# Patient Record
Sex: MALE | Race: WHITE
[De-identification: names, ages, dates, MRNs, and addresses within clinical notes are randomized per-mention and may not be internally consistent; named-entity substitution may affect disease eponyms.]

---

## 2020-06-10 ENCOUNTER — HOSPITAL ENCOUNTER (INPATIENT)
Dept: HOSPITAL 61 - ER | Age: 40
LOS: 1 days | Discharge: LEFT BEFORE DISCHARGE | DRG: 872 | End: 2020-06-11
Attending: INTERNAL MEDICINE | Admitting: INTERNAL MEDICINE
Payer: SELF-PAY

## 2020-06-10 VITALS — BODY MASS INDEX: 41.57 KG/M2 | HEIGHT: 69 IN | WEIGHT: 280.65 LBS

## 2020-06-10 VITALS — DIASTOLIC BLOOD PRESSURE: 88 MMHG | SYSTOLIC BLOOD PRESSURE: 131 MMHG

## 2020-06-10 DIAGNOSIS — L02.411: ICD-10-CM

## 2020-06-10 DIAGNOSIS — F17.210: ICD-10-CM

## 2020-06-10 DIAGNOSIS — Z82.49: ICD-10-CM

## 2020-06-10 DIAGNOSIS — I10: ICD-10-CM

## 2020-06-10 DIAGNOSIS — Z83.49: ICD-10-CM

## 2020-06-10 DIAGNOSIS — K57.90: ICD-10-CM

## 2020-06-10 DIAGNOSIS — Z71.3: ICD-10-CM

## 2020-06-10 DIAGNOSIS — Z83.3: ICD-10-CM

## 2020-06-10 DIAGNOSIS — E66.9: ICD-10-CM

## 2020-06-10 DIAGNOSIS — Z20.828: ICD-10-CM

## 2020-06-10 DIAGNOSIS — L02.92: ICD-10-CM

## 2020-06-10 DIAGNOSIS — K21.9: ICD-10-CM

## 2020-06-10 DIAGNOSIS — A41.9: Primary | ICD-10-CM

## 2020-06-10 DIAGNOSIS — E11.65: ICD-10-CM

## 2020-06-10 DIAGNOSIS — L03.111: ICD-10-CM

## 2020-06-10 DIAGNOSIS — Z88.1: ICD-10-CM

## 2020-06-10 LAB
% BANDS: 2 % (ref 0–9)
% LYMPHS: 14 % (ref 24–48)
% MONOS: 5 % (ref 0–10)
% SEGS: 79 % (ref 35–66)
ALBUMIN SERPL-MCNC: 3.5 G/DL (ref 3.4–5)
ALBUMIN/GLOB SERPL: 0.9 {RATIO} (ref 1–1.7)
ALP SERPL-CCNC: 73 U/L (ref 46–116)
ALT SERPL-CCNC: 31 U/L (ref 16–63)
ANION GAP SERPL CALC-SCNC: 13 MMOL/L (ref 6–14)
APTT BLD: 36 SEC (ref 24–38)
AST SERPL-CCNC: 17 U/L (ref 15–37)
BASOPHILS # BLD AUTO: 0.1 X10^3/UL (ref 0–0.2)
BASOPHILS NFR BLD: 1 % (ref 0–3)
BILIRUB SERPL-MCNC: 0.3 MG/DL (ref 0.2–1)
BUN SERPL-MCNC: 9 MG/DL (ref 8–26)
BUN/CREAT SERPL: 9 (ref 6–20)
CALCIUM SERPL-MCNC: 9.2 MG/DL (ref 8.5–10.1)
CHLORIDE SERPL-SCNC: 102 MMOL/L (ref 98–107)
CO2 SERPL-SCNC: 23 MMOL/L (ref 21–32)
CREAT SERPL-MCNC: 1 MG/DL (ref 0.7–1.3)
EOSINOPHIL NFR BLD: 0.1 X10^3/UL (ref 0–0.7)
EOSINOPHIL NFR BLD: 1 % (ref 0–3)
ERYTHROCYTE [DISTWIDTH] IN BLOOD BY AUTOMATED COUNT: 13.6 % (ref 11.5–14.5)
GFR SERPLBLD BASED ON 1.73 SQ M-ARVRAT: 82.3 ML/MIN
GLOBULIN SER-MCNC: 4 G/DL (ref 2.2–3.8)
GLUCOSE SERPL-MCNC: 104 MG/DL (ref 70–99)
HCT VFR BLD CALC: 39.6 % (ref 39–53)
HGB BLD-MCNC: 13.6 G/DL (ref 13–17.5)
LYMPHOCYTES # BLD: 2.1 X10^3/UL (ref 1–4.8)
LYMPHOCYTES NFR BLD AUTO: 13 % (ref 24–48)
MCH RBC QN AUTO: 31 PG (ref 25–35)
MCHC RBC AUTO-ENTMCNC: 34 G/DL (ref 31–37)
MCV RBC AUTO: 89 FL (ref 79–100)
MONO #: 1 X10^3/UL (ref 0–1.1)
MONOCYTES NFR BLD: 6 % (ref 0–9)
NEUT #: 13.4 X10^3/UL (ref 1.8–7.7)
NEUTROPHILS NFR BLD AUTO: 80 % (ref 31–73)
PLATELET # BLD AUTO: 217 X10^3/UL (ref 140–400)
PLATELET # BLD EST: ADEQUATE 10*3/UL
POTASSIUM SERPL-SCNC: 3.3 MMOL/L (ref 3.5–5.1)
PROT SERPL-MCNC: 7.5 G/DL (ref 6.4–8.2)
PROTHROMBIN TIME: 13.6 SEC (ref 11.7–14)
RBC # BLD AUTO: 4.45 X10^6/UL (ref 4.3–5.7)
SODIUM SERPL-SCNC: 138 MMOL/L (ref 136–145)
WBC # BLD AUTO: 16.7 X10^3/UL (ref 4–11)

## 2020-06-10 PROCEDURE — 85730 THROMBOPLASTIN TIME PARTIAL: CPT

## 2020-06-10 PROCEDURE — 87040 BLOOD CULTURE FOR BACTERIA: CPT

## 2020-06-10 PROCEDURE — 85007 BL SMEAR W/DIFF WBC COUNT: CPT

## 2020-06-10 PROCEDURE — 83605 ASSAY OF LACTIC ACID: CPT

## 2020-06-10 PROCEDURE — 36415 COLL VENOUS BLD VENIPUNCTURE: CPT

## 2020-06-10 PROCEDURE — 80053 COMPREHEN METABOLIC PANEL: CPT

## 2020-06-10 PROCEDURE — 73201 CT UPPER EXTREMITY W/DYE: CPT

## 2020-06-10 PROCEDURE — G0378 HOSPITAL OBSERVATION PER HR: HCPCS

## 2020-06-10 PROCEDURE — 96365 THER/PROPH/DIAG IV INF INIT: CPT

## 2020-06-10 PROCEDURE — 96368 THER/DIAG CONCURRENT INF: CPT

## 2020-06-10 PROCEDURE — 82962 GLUCOSE BLOOD TEST: CPT

## 2020-06-10 PROCEDURE — 85610 PROTHROMBIN TIME: CPT

## 2020-06-10 PROCEDURE — 85025 COMPLETE CBC W/AUTO DIFF WBC: CPT

## 2020-06-10 PROCEDURE — 80048 BASIC METABOLIC PNL TOTAL CA: CPT

## 2020-06-10 PROCEDURE — U0003 INFECTIOUS AGENT DETECTION BY NUCLEIC ACID (DNA OR RNA); SEVERE ACUTE RESPIRATORY SYNDROME CORONAVIRUS 2 (SARS-COV-2) (CORONAVIRUS DISEASE [COVID-19]), AMPLIFIED PROBE TECHNIQUE, MAKING USE OF HIGH THROUGHPUT TECHNOLOGIES AS DESCRIBED BY CMS-2020-01-R: HCPCS

## 2020-06-10 PROCEDURE — 76881 US COMPL JOINT R-T W/IMG: CPT

## 2020-06-10 PROCEDURE — 84145 PROCALCITONIN (PCT): CPT

## 2020-06-10 PROCEDURE — 83735 ASSAY OF MAGNESIUM: CPT

## 2020-06-10 PROCEDURE — 96375 TX/PRO/DX INJ NEW DRUG ADDON: CPT

## 2020-06-10 RX ADMIN — BACITRACIN SCH MLS/HR: 5000 INJECTION, POWDER, FOR SOLUTION INTRAMUSCULAR at 20:40

## 2020-06-10 RX ADMIN — BACITRACIN SCH MLS/HR: 5000 INJECTION, POWDER, FOR SOLUTION INTRAMUSCULAR at 22:40

## 2020-06-10 RX ADMIN — BACITRACIN SCH MLS/HR: 5000 INJECTION, POWDER, FOR SOLUTION INTRAMUSCULAR at 20:11

## 2020-06-10 NOTE — RAD
Exam: Ultrasound right extremity nonvascular soft tissue

 

Indication: Question right axilla abscess

 

Technique: Real-time grayscale and color Doppler images of the right 

axilla were obtained by the department sonographer.

 

Comparisons: None

 

FINDINGS:

Imaging of the right axilla performed. No discrete fluid collection is 

identified. There is soft tissue edema noted throughout the right axilla. 

 

IMPRESSION:

No discrete fluid collection. Edema is noted interdigitating within the 

fat of the right axilla.

 

Electronically signed by: Olesya Hunt MD (6/10/2020 10:16 PM) [id number]

## 2020-06-10 NOTE — PDOC1
History and Physical


Date of Admission


Date of Admission


DATE: 6/10/20 


TIME: 21:56





Identification/Chief Complaint


Chief Complaint


Right axillary pain





Source


Source:  Patient





History of Present Illness


History of Present Illness


Mr Gil is a 38 yo M PMHx acid reflux who presents to the ED today with right

axillary pain, swelling, redness began 3 days ago. Denies any drainage from the 

area. He has been washing regularly, not wearing deodorant. He is a racecar 

 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


On ROS he has had headache and fever and chills over the past day as well as 

some nausea and was afraid he was becoming septic.


WBC 16.7,  bpm


Hb 13.6, platelets 217, K 3.3. INR 1.1. Glucose 104. Lactic is normal, pro 

calcitonin 0.15.


Started on vancomycin and zosyn.


Admitted for further treatment.





Past Surgical History


Past Surgical History:  Other (Left knee arthroscopy)





Family History


Family History:  Diabetes, High Cholestrol, Hypertension





Social History


Smoke:  1 pack per day


ALCOHOL:  social


Drugs:  None





Current Problem List


Problem List


Problems


Medical Problems:


(1) Abscess of axilla, right


Status: Acute  





(2) Cellulitis of axilla, right


Status: Acute  











Current Medications


Current Medications





Current Medications


Sodium Chloride 1,000 ml @  2,130 mls/hr Q29M IV ;  Start 6/10/20 at 20:11;  

Stop 6/10/20 at 21:11;  Status DC


Piperacillin Sod/ Tazobactam Sod 4.5 gm/Sodium Chloride 100 ml @  200 mls/hr 1X 

ONCE IV  Last administered on 6/10/20at 20:46;  Start 6/10/20 at 21:00;  Stop 

6/10/20 at 21:29;  Status DC


Vancomycin HCl (Vanco Per Pharmacy) 1 each 1X  ONCE MC ;  Start 6/10/20 at 

20:15;  Stop 6/10/20 at 21:47;  Status DC


Morphine Sulfate (Morphine Sulfate) 2 mg PRN Q15MIN  PRN IV/SQ PAIN GREATER THAN

3/10 Last administered on 6/10/20at 20:40;  Start 6/10/20 at 20:15;  Stop 6/11/ 20 at 20:14


Acetaminophen (Tylenol) 1,000 mg 1X  ONCE PO  Last administered on 6/10/20at 

20:38;  Start 6/10/20 at 20:15;  Stop 6/10/20 at 20:20;  Status DC


Vancomycin HCl 2 gm/Sodium Chloride 500 ml @  250 mls/hr 1X  ONCE IV  Last 

administered on 6/10/20at 20:47;  Start 6/10/20 at 21:00;  Stop 6/10/20 at 22:59


Ondansetron HCl (Zofran) 4 mg PRN Q8HRS  PRN IV NAUSEA/VOMITING;  Start 6/10/20 

at 21:45;  Stop 6/11/20 at 21:44


Morphine Sulfate (Morphine Sulfate) 4 mg PRN Q2HR  PRN IV PAIN;  Start 6/10/20 

at 21:45;  Stop 6/11/20 at 21:44


Sodium Chloride 1,000 ml @  100 mls/hr 1X  ONCE IV ;  Start 6/10/20 at 21:45;  

Stop 6/11/20 at 07:44





Allergies


Allergies:  


Coded Allergies:  


     erythromycin base (Verified  Allergy, Unknown, Nausea, 6/10/20)


   


   SEVERE ABD PAIN AND NAUSEA





ROS


General:  YES: Chills, Night Sweats, Fatigue, Malaise; 


   No: Appetite, Other


PSYCHOLOGICAL ROS:  No: Anxiety, Behavioral Disorder, Concentration difficultie,

Decreased libido, Depression, Disorientation, Hallucinations, Hostility, 

Irritablity, Memory difficulties, Mood Swings, Obsessive thoughts, Physical 

abuse, Sexual abuse, Sleep disturbances, Suicidal ideation, Other


Eyes:  No Blurry vision, No Decreased vision, No Double vision, No Dry eyes, No 

Excessive tearing, No Eye Pain, No Itchy Eyes, No Loss of vision, No 

Photophobia, No Scotomata, No Uses contacts, No Uses glasses, No Other


HEENT:  No: Heacaches, Visual Changes, Hearing change, Nasal congestion, Nasal 

discharge, Oral lesions, Sinus pain, Sore Throat, Epistaxis, Sneezing, Snoring, 

Tinnitus, Vertigo, Vocal changes, Other


ALLERGY AND IMMUNOLOGY:  No: Hives, Insect Bite Sensitivity, Itchy/Watery Eyes, 

Nasal Congestion, Post Nasal Drip, Seasonal Allergies, Other


Hematological and Lymphatic:  No: Bleeding Problems, Blood Clots, Blood Trans

fusions, Brusing, Night Sweats, Pallor, Swollen Lymph Nodes, Other


ENDOCRINE:  No: Breast Changes, Galactorrhea, Hair Pattern Changes, Hot Flashes,

Malaise/lethargy, Mood Swings, Palpitations, Polydipsia/polyuria, Skin Changes, 

Temperature Intolerance, Unexpected Weight Changes, Other


Breast:  No New/Changing Breast Lumps, No Nipple changes, No Nipple discharge, 

No Other


Respiratory:  No: Cough, Hemoptysis, Orthopnea, Pleuritic Pain, Shortness of 

breath, SOB with excertion, Sputum Changes, Stridor, Tachypnea, Wheezing, Other


Cardiovascular:  No Chest Pain, No Palpitations, No Orthopnea, No Paroxysmal 

Noc. Dyspnea, No Edema, No Lt Headedness, No Other


Gastrointestinal:  Yes Nausea; 


   No Vomiting, No Abdominal Pain, No Diarrhea, No Constipation, No Melena, No 

Hematochezia, No Other


Genitourinary:  No Dysuria, No Frequency, No Incontinence, No Hematuria, No 

Retention, No Discharge, No Urgency, No Pain, No Flank Pain, No Other, No , No ,

No , No , No , No , No 


Musculoskeletal:  No Gait Disturbance, No Joint Pain, No Joint Stiffness, No 

Joint Swelling, No Muscle Pain, No Muscular Weakness, No Pain In:, No Swelling 

In:, No Other


Neurological:  No Behavorial Changes, No Bowel/Bladder ControlChng, No Confus

ion, No Dizziness, No Gait Disturbance, No Headaches, No Impaired Coord/balance,

No Memory Loss, No Numbness/Tingling, No Seizures, No Speech Problems, No 

Tremors, No Visual Changes, No Weakness, No Other


Skin:  Yes Rash, Yes Skin Lesion Changes; 


   No Dry Skin, No Eczema, No Hair Changes, No Lumps, No Mole Changes, No M

ottling, No Nail Changes, No Pruritus, No Other, No Acne





Physical Exam


General:  Alert, Oriented X3, Cooperative, mild distress


HEENT:  Atraumatic, PERRLA, EOMI, Mucous membr. moist/pink


Lungs:  Clear to auscultation, Normal air movement


Heart:  S1S2, RRR, no thrills, no rubs, no gallops, no murmurs


Abdomen:  Normal bowel sounds, Soft, No tenderness, No hepatosplenomegaly, No 

masses


Extremities:  No clubbing, No cyanosis, No edema, Normal pulses, No 

tenderness/swelling


Skin:  No breakdown, No significant lesion, Other (Large [id number] warm red right

axilla with hard mass 4cm x6cm in center, painful to palpation)


Neuro:  Normal gait, Normal speech, Strength at 5/5 X4 ext, Normal tone, 

Sensation intact, Cranial nerves 3-12 NL, Reflexes 2+


Psych/Mental Status:  Mental status NL, Mood NL





Vitals


Vitals





Vital Signs








  Date Time  Temp Pulse Resp B/P (MAP) Pulse Ox O2 Delivery O2 Flow Rate FiO2


 


6/10/20 19:23 99.8 113 18 139/80 (99) 97 Room Air  





 99.8       











Labs


Labs





Laboratory Tests








Test


 6/10/20


20:08


 


White Blood Count


 16.7 x10^3/uL


(4.0-11.0)


 


Red Blood Count


 4.45 x10^6/uL


(4.30-5.70)


 


Hemoglobin


 13.6 g/dL


(13.0-17.5)


 


Hematocrit


 39.6 %


(39.0-53.0)


 


Mean Corpuscular Volume 89 fL () 


 


Mean Corpuscular Hemoglobin 31 pg (25-35) 


 


Mean Corpuscular Hemoglobin


Concent 34 g/dL


(31-37)


 


Red Cell Distribution Width


 13.6 %


(11.5-14.5)


 


Platelet Count


 217 x10^3/uL


(140-400)


 


Neutrophils (%) (Auto) 80 % (31-73) 


 


Lymphocytes (%) (Auto) 13 % (24-48) 


 


Monocytes (%) (Auto) 6 % (0-9) 


 


Eosinophils (%) (Auto) 1 % (0-3) 


 


Basophils (%) (Auto) 1 % (0-3) 


 


Neutrophils # (Auto)


 13.4 x10^3/uL


(1.8-7.7)


 


Lymphocytes # (Auto)


 2.1 x10^3/uL


(1.0-4.8)


 


Monocytes # (Auto)


 1.0 x10^3/uL


(0.0-1.1)


 


Eosinophils # (Auto)


 0.1 x10^3/uL


(0.0-0.7)


 


Basophils # (Auto)


 0.1 x10^3/uL


(0.0-0.2)


 


Segmented Neutrophils % 79 % (35-66) 


 


Band Neutrophils % 2 % (0-9) 


 


Lymphocytes % 14 % (24-48) 


 


Monocytes % 5 % (0-10) 


 


Platelet Estimate


 Adequate


(ADEQUATE)


 


Prothrombin Time


 13.6 SEC


(11.7-14.0)


 


Prothromb Time International


Ratio 1.1 (0.8-1.1) 





 


Activated Partial


Thromboplast Time 36 SEC (24-38) 





 


Sodium Level


 138 mmol/L


(136-145)


 


Potassium Level


 3.3 mmol/L


(3.5-5.1)


 


Chloride Level


 102 mmol/L


()


 


Carbon Dioxide Level


 23 mmol/L


(21-32)


 


Anion Gap 13 (6-14) 


 


Blood Urea Nitrogen 9 mg/dL (8-26) 


 


Creatinine


 1.0 mg/dL


(0.7-1.3)


 


Estimated GFR


(Cockcroft-Gault) 82.3 





 


BUN/Creatinine Ratio 9 (6-20) 


 


Glucose Level


 104 mg/dL


(70-99)


 


Lactic Acid Level


 0.7 mmol/L


(0.4-2.0)


 


Calcium Level


 9.2 mg/dL


(8.5-10.1)


 


Total Bilirubin


 0.3 mg/dL


(0.2-1.0)


 


Aspartate Amino Transf


(AST/SGOT) 17 U/L (15-37) 





 


Alanine Aminotransferase


(ALT/SGPT) 31 U/L (16-63) 





 


Alkaline Phosphatase


 73 U/L


()


 


Total Protein


 7.5 g/dL


(6.4-8.2)


 


Albumin


 3.5 g/dL


(3.4-5.0)


 


Albumin/Globulin Ratio 0.9 (1.0-1.7) 


 


Procalcitonin


 0.15 ng/mL


(0.00-0.10)








Laboratory Tests








Test


 6/10/20


20:08


 


White Blood Count


 16.7 x10^3/uL


(4.0-11.0)


 


Red Blood Count


 4.45 x10^6/uL


(4.30-5.70)


 


Hemoglobin


 13.6 g/dL


(13.0-17.5)


 


Hematocrit


 39.6 %


(39.0-53.0)


 


Mean Corpuscular Volume 89 fL () 


 


Mean Corpuscular Hemoglobin 31 pg (25-35) 


 


Mean Corpuscular Hemoglobin


Concent 34 g/dL


(31-37)


 


Red Cell Distribution Width


 13.6 %


(11.5-14.5)


 


Platelet Count


 217 x10^3/uL


(140-400)


 


Neutrophils (%) (Auto) 80 % (31-73) 


 


Lymphocytes (%) (Auto) 13 % (24-48) 


 


Monocytes (%) (Auto) 6 % (0-9) 


 


Eosinophils (%) (Auto) 1 % (0-3) 


 


Basophils (%) (Auto) 1 % (0-3) 


 


Neutrophils # (Auto)


 13.4 x10^3/uL


(1.8-7.7)


 


Lymphocytes # (Auto)


 2.1 x10^3/uL


(1.0-4.8)


 


Monocytes # (Auto)


 1.0 x10^3/uL


(0.0-1.1)


 


Eosinophils # (Auto)


 0.1 x10^3/uL


(0.0-0.7)


 


Basophils # (Auto)


 0.1 x10^3/uL


(0.0-0.2)


 


Segmented Neutrophils % 79 % (35-66) 


 


Band Neutrophils % 2 % (0-9) 


 


Lymphocytes % 14 % (24-48) 


 


Monocytes % 5 % (0-10) 


 


Platelet Estimate


 Adequate


(ADEQUATE)


 


Prothrombin Time


 13.6 SEC


(11.7-14.0)


 


Prothromb Time International


Ratio 1.1 (0.8-1.1) 





 


Activated Partial


Thromboplast Time 36 SEC (24-38) 





 


Sodium Level


 138 mmol/L


(136-145)


 


Potassium Level


 3.3 mmol/L


(3.5-5.1)


 


Chloride Level


 102 mmol/L


()


 


Carbon Dioxide Level


 23 mmol/L


(21-32)


 


Anion Gap 13 (6-14) 


 


Blood Urea Nitrogen 9 mg/dL (8-26) 


 


Creatinine


 1.0 mg/dL


(0.7-1.3)


 


Estimated GFR


(Cockcroft-Gault) 82.3 





 


BUN/Creatinine Ratio 9 (6-20) 


 


Glucose Level


 104 mg/dL


(70-99)


 


Lactic Acid Level


 0.7 mmol/L


(0.4-2.0)


 


Calcium Level


 9.2 mg/dL


(8.5-10.1)


 


Total Bilirubin


 0.3 mg/dL


(0.2-1.0)


 


Aspartate Amino Transf


(AST/SGOT) 17 U/L (15-37) 





 


Alanine Aminotransferase


(ALT/SGPT) 31 U/L (16-63) 





 


Alkaline Phosphatase


 73 U/L


()


 


Total Protein


 7.5 g/dL


(6.4-8.2)


 


Albumin


 3.5 g/dL


(3.4-5.0)


 


Albumin/Globulin Ratio 0.9 (1.0-1.7) 


 


Procalcitonin


 0.15 ng/mL


(0.00-0.10)











Images


Images


US right axilla - Imaging of the right axilla performed. No discrete fluid 

collection is identified. There is soft tissue edema noted throughout the right 

axilla. 


 


IMPRESSION:


No discrete fluid collection. Edema is noted interdigitating within the fat of 

the right axilla.





VTE Prophylaxis Ordered


VTE Prophylaxis Devices:  Yes


VTE Pharmacological Prophylaxi:  No





Assessment/Plan


Assessment/Plan


A/P:


Right Axillary abscess - empiric vancomycin and zosyn, consult general surgery


Right axillary cellulitis - empiric vancomycin and zosyn


Sepsis - secondary to above with leukocytosis and tachycardia, given the size, 

extent and sepsis outpatient therapy is inappropriate. Will cont empiric 

vancomycin and zosyn, consult general surgery


Hyperglycemia - will check A1c, likely has diabetes based on his home glucometer

reading > 500mg/dL fasting


Obesity - counseled on diet, exercise, weight loss


Smoker - counseled, offered nicotine patch





FEN - ADA diet


PPX - ambulatory, low risk


FULL CODE


Dispo - inpatient for above





Justicifation of Admission Dx:


Justifications for Admission:


Justification of Admission Dx:  Yes


Sepsis:  Parenteral Antimicrobial











RUDY MONTOYA MD        Ramon 10, 2020 21:56

## 2020-06-10 NOTE — PHYS DOC
Past Medical History


Past Medical History:  Diabetes-Type II, GERD


Additional Past Medical Histor:  PRE HTN


Past Surgical History:  Other


Additional Past Surgical Histo:  L KNEE


Smoking Status:  Current Every Day Smoker


Alcohol Use:  Rarely





General Adult


EDM:


Chief Complaint:  ABSCESS





HPI:


HPI:





Patient is a 39  year old male with history of diabetes type 2, acid reflux, who

presents to the ED today with an axilla abscess that began 3 days ago.  Patient 

denies any fever, denies any drainage from the area.





Review of Systems:


Review of Systems:


Constitutional:  Denies fever or chills. []


Eyes:  Denies change in visual acuity. []


HENT:  Denies nasal congestion or sore throat. [] 


Respiratory:  Denies cough or shortness of breath. [] 


Cardiovascular:  Denies chest pain or edema. [] 


GI:  Denies abdominal pain, nausea, vomiting, bloody stools or diarrhea. [] 


:  Denies dysuria. [] 


Musculoskeletal:  Denies back pain or joint pain. [] 


Integument: Reports right axilla abscess


Neurologic:  Denies headache, focal weakness or sensory changes. [] 


Psychiatric:  Denies depression or anxiety. []





Heart Score:


Risk Factors:


Risk Factors:  DM, Current or recent (<one month) smoker, HTN, HLP, family 

history of CAD, obesity.


Risk Scores:


Score 0 - 3:  2.5% MACE over next 6 weeks - Discharge Home


Score 4 - 6:  20.3% MACE over next 6 weeks - Admit for Clinical Observation


Score 7 - 10:  72.7% MACE over next 6 weeks - Early Invasive Strategies





Current Medications:





Current Medications








 Medications


  (Trade)  Dose


 Ordered  Sig/[facility]  Start Time


 Stop Time Status Last Admin


Dose Admin


 


 Acetaminophen


  (Tylenol)  1,000 mg  1X  ONCE  6/10/20 20:15


 6/10/20 20:20 DC 6/10/20 20:38


1,000 MG


 


 Morphine Sulfate


  (Morphine


 Sulfate)  2 mg  PRN Q15MIN  PRN  6/10/20 20:15


 6/11/20 20:14  6/10/20 20:40


2 MG


 


 Piperacillin Sod/


 Tazobactam Sod


 4.5 gm/Sodium


 Chloride  100 ml @ 


 200 mls/hr  1X  ONCE  6/10/20 21:00


 6/10/20 21:29  6/10/20 20:46


200 MLS/HR


 


 Sodium Chloride  1,000 ml @ 


 2,130 mls/hr  Q29M  6/10/20 20:11


 6/10/20 21:11 DC  





 


 Vancomycin HCl


  (Vanco Per


 Pharmacy)  1 each  1X  ONCE  6/10/20 20:15


 6/10/20 20:16 UNV  





 


 Vancomycin HCl 2


 gm/Sodium Chloride  500 ml @ 


 250 mls/hr  1X  ONCE  6/10/20 21:00


 6/10/20 22:59  6/10/20 20:47


250 MLS/HR











Allergies:


Allergies:





Allergies








Coded Allergies Type Severity Reaction Last Updated Verified


 


  erythromycin base Allergy Unknown Nausea 6/10/20 Yes











Physical Exam:


PE:





Constitutional: Well developed, well nourished, no acute distress, non-toxic 

appearance. []


HENT: Normocephalic, atraumatic, bilateral external ears normal, oropharynx 

moist, no oral exudates, nose normal. []


Eyes: PERRLA, EOMI, conjunctiva normal, no discharge. [] 


Neck: Normal range of motion, no tenderness, supple, no stridor. [] 


Cardiovascular:Heart rate regular rhythm, no murmur []


Lungs & Thorax:  Bilateral breath sounds clear to auscultation []


Abdomen: Bowel sounds normal, soft, no tenderness, no masses, no pulsatile 

masses. [] 


Skin: Warm, dry, right axilla with a palpable mass roughly 8 x 4 cm.  With 

surrounding mild cellulitis.  The abscess is firm to touch, no fluctuance.


Back: No tenderness, no CVA tenderness. [] 


Extremities: No tenderness, no cyanosis, no clubbing, ROM intact, no edema. [] 


Neurologic: Alert and oriented X 3, normal motor function, normal sensory 

function, no focal deficits noted. []


Psychologic: Affect normal, judgement normal, mood normal. []





Current Patient Data:


Labs:





                                Laboratory Tests








Test


 6/10/20


20:08


 


White Blood Count


 16.7 x10^3/uL


(4.0-11.0)  H


 


Red Blood Count


 4.45 x10^6/uL


(4.30-5.70)


 


Hemoglobin


 13.6 g/dL


(13.0-17.5)


 


Hematocrit


 39.6 %


(39.0-53.0)


 


Mean Corpuscular Volume


 89 fL ()





 


Mean Corpuscular Hemoglobin 31 pg (25-35)  


 


Mean Corpuscular Hemoglobin


Concent 34 g/dL


(31-37)


 


Red Cell Distribution Width


 13.6 %


(11.5-14.5)


 


Platelet Count


 217 x10^3/uL


(140-400)


 


Neutrophils (%) (Auto) 80 % (31-73)  H


 


Lymphocytes (%) (Auto) 13 % (24-48)  L


 


Monocytes (%) (Auto) 6 % (0-9)  


 


Eosinophils (%) (Auto) 1 % (0-3)  


 


Basophils (%) (Auto) 1 % (0-3)  


 


Neutrophils # (Auto)


 13.4 x10^3/uL


(1.8-7.7)  H


 


Lymphocytes # (Auto)


 2.1 x10^3/uL


(1.0-4.8)


 


Monocytes # (Auto)


 1.0 x10^3/uL


(0.0-1.1)


 


Eosinophils # (Auto)


 0.1 x10^3/uL


(0.0-0.7)


 


Basophils # (Auto)


 0.1 x10^3/uL


(0.0-0.2)


 


Segmented Neutrophils % 79 % (35-66)  H


 


Band Neutrophils % 2 % (0-9)  


 


Lymphocytes % 14 % (24-48)  L


 


Monocytes % 5 % (0-10)  


 


Platelet Estimate


 Adequate


(ADEQUATE)


 


Prothrombin Time


 13.6 SEC


(11.7-14.0)


 


Prothrombin Time INR 1.1 (0.8-1.1)  


 


Activated Partial


Thromboplast Time 36 SEC (24-38)





 


Sodium Level


 138 mmol/L


(136-145)


 


Potassium Level


 3.3 mmol/L


(3.5-5.1)  L


 


Chloride Level


 102 mmol/L


()


 


Carbon Dioxide Level


 23 mmol/L


(21-32)


 


Anion Gap 13 (6-14)  


 


Blood Urea Nitrogen


 9 mg/dL (8-26)





 


Creatinine


 1.0 mg/dL


(0.7-1.3)


 


Estimated GFR


(Cockcroft-Gault) 82.3  





 


BUN/Creatinine Ratio 9 (6-20)  


 


Glucose Level


 104 mg/dL


(70-99)  H


 


Lactic Acid Level


 0.7 mmol/L


(0.4-2.0)


 


Calcium Level


 9.2 mg/dL


(8.5-10.1)


 


Total Bilirubin


 0.3 mg/dL


(0.2-1.0)


 


Aspartate Amino Transferase


(AST) 17 U/L (15-37)





 


Alanine Aminotransferase (ALT)


 31 U/L (16-63)





 


Alkaline Phosphatase


 73 U/L


()


 


Total Protein


 7.5 g/dL


(6.4-8.2)


 


Albumin


 3.5 g/dL


(3.4-5.0)


 


Albumin/Globulin Ratio


 0.9 (1.0-1.7)


L


 


Procalcitonin


 0.15 ng/mL


(0.00-0.10)  H





                                Laboratory Tests


6/10/20 20:08








                                Laboratory Tests


6/10/20 20:08








Vital Signs:





                                   Vital Signs








  Date Time  Temp Pulse Resp B/P (MAP) Pulse Ox O2 Delivery O2 Flow Rate FiO2


 


6/10/20 19:23 99.8 113 18 139/80 (99) 97 Room Air  





 99.8       











EKG:


EKG:


[]





Radiology/Procedures:


Radiology/Procedures:


[]





Course & Med Decision Making:


Course & Med Decision Making


Pertinent Labs and Imaging studies reviewed. (See chart for details)





This is a 39-year-old male patient presenting to the ED today with right axilla 

abscess that began 3 days ago.  The abscess or cellulitis surrounding it, the 

abscess is firm, no fluctuance.  Patient is running a slight temp of 99.8 with a

 heart rate of 113.





CBC with a WBC of 16.8, CMP with no acute findings, lactic is normal, pro 

calcitonin 0.15.








Spoke with Dr. Gonzáles who accepted patient for admission, routine consult was 

placed for general surgery.  N.p.o.





Ultrasound of the right axilla ordered.





Dragon Disclaimer:


Dragon Disclaimer:


This electronic medical record was generated, in whole or in part, using a voice

 recognition dictation system.





Departure


Departure


Impression:  


   Primary Impression:  


   Abscess of axilla, right


   Additional Impression:  


   Cellulitis of axilla, right


Disposition:  09 ADMITTED AS INPATIENT


Condition:  STABLE


Referrals:  


NO PCP (PCP)





Justicifation of Admission Dx:


Justifications for Admission:


Justification of Admission Dx:  Yes


Cellulitis:  Cellulitis











VY JORGENSEN APRN              R[place] 10, 2020 21:20

## 2020-06-11 VITALS — DIASTOLIC BLOOD PRESSURE: 69 MMHG | SYSTOLIC BLOOD PRESSURE: 103 MMHG

## 2020-06-11 VITALS — DIASTOLIC BLOOD PRESSURE: 61 MMHG | SYSTOLIC BLOOD PRESSURE: 115 MMHG

## 2020-06-11 VITALS — DIASTOLIC BLOOD PRESSURE: 64 MMHG | SYSTOLIC BLOOD PRESSURE: 123 MMHG

## 2020-06-11 VITALS — SYSTOLIC BLOOD PRESSURE: 139 MMHG | DIASTOLIC BLOOD PRESSURE: 78 MMHG

## 2020-06-11 LAB
ANION GAP SERPL CALC-SCNC: 10 MMOL/L (ref 6–14)
BASOPHILS # BLD AUTO: 0 X10^3/UL (ref 0–0.2)
BASOPHILS NFR BLD: 0 % (ref 0–3)
BUN SERPL-MCNC: 10 MG/DL (ref 8–26)
CALCIUM SERPL-MCNC: 8.5 MG/DL (ref 8.5–10.1)
CHLORIDE SERPL-SCNC: 105 MMOL/L (ref 98–107)
CO2 SERPL-SCNC: 25 MMOL/L (ref 21–32)
CREAT SERPL-MCNC: 1.1 MG/DL (ref 0.7–1.3)
EOSINOPHIL NFR BLD: 0.2 X10^3/UL (ref 0–0.7)
EOSINOPHIL NFR BLD: 2 % (ref 0–3)
ERYTHROCYTE [DISTWIDTH] IN BLOOD BY AUTOMATED COUNT: 13.6 % (ref 11.5–14.5)
GFR SERPLBLD BASED ON 1.73 SQ M-ARVRAT: 73.8 ML/MIN
GLUCOSE SERPL-MCNC: 105 MG/DL (ref 70–99)
HCT VFR BLD CALC: 38.5 % (ref 39–53)
HGB BLD-MCNC: 12.8 G/DL (ref 13–17.5)
LYMPHOCYTES # BLD: 1.9 X10^3/UL (ref 1–4.8)
LYMPHOCYTES NFR BLD AUTO: 15 % (ref 24–48)
MAGNESIUM SERPL-MCNC: 2 MG/DL (ref 1.8–2.4)
MCH RBC QN AUTO: 30 PG (ref 25–35)
MCHC RBC AUTO-ENTMCNC: 33 G/DL (ref 31–37)
MCV RBC AUTO: 91 FL (ref 79–100)
MONO #: 1 X10^3/UL (ref 0–1.1)
MONOCYTES NFR BLD: 8 % (ref 0–9)
NEUT #: 9.6 X10^3/UL (ref 1.8–7.7)
NEUTROPHILS NFR BLD AUTO: 75 % (ref 31–73)
PLATELET # BLD AUTO: 203 X10^3/UL (ref 140–400)
POTASSIUM SERPL-SCNC: 3.7 MMOL/L (ref 3.5–5.1)
RBC # BLD AUTO: 4.25 X10^6/UL (ref 4.3–5.7)
SODIUM SERPL-SCNC: 140 MMOL/L (ref 136–145)
WBC # BLD AUTO: 12.8 X10^3/UL (ref 4–11)

## 2020-06-11 RX ADMIN — INSULIN LISPRO SCH UNITS: 100 INJECTION, SOLUTION INTRAVENOUS; SUBCUTANEOUS at 08:00

## 2020-06-11 RX ADMIN — INSULIN LISPRO SCH UNITS: 100 INJECTION, SOLUTION INTRAVENOUS; SUBCUTANEOUS at 16:37

## 2020-06-11 RX ADMIN — INSULIN LISPRO SCH UNITS: 100 INJECTION, SOLUTION INTRAVENOUS; SUBCUTANEOUS at 11:38

## 2020-06-11 NOTE — RAD
EXAM: CT right shoulder with IV contrast

 

INDICATION: Reason: axillary possible abscess/cellulitis/mass / Spl. 

Instructions:  / History: 

 

TECHNIQUE: Helical CT of the right shoulder was performed with IV contrast

and reviewed in multiplanar reformats. All CT scans performed at this 

facility utilize dose optimization techniques as appropriate to the exam, 

including the following: Automated exposure control and adjustment of the 

mA and/or KV according to patient size (this includes techniques or 

standardized protocols for targeted exams where dose is indication/reason 

for exam).

 

IV CONTRAST: 25 mL Omnipaque 300

 

COMPARISON: Nonvascular right upper extremity ultrasound of 6/10/2020

 

FINDINGS: 

 

 The bones show normal osseous alignment and mineralization with no 

fracture or aggressive osseous lesions. No significant degenerative 

changes are shown in the acromioclavicular and glenohumeral joints.

 

The soft tissues show soft tissue stranding in the right axillary 

subcutaneous fat without an organized fluid collection or discrete soft 

tissue mass. No adenopathy in the right axilla is appreciated. No acute 

vascular pathology is seen either. No radiopaque foreign body.

 

IMPRESSION:

 

CT findings compatible with cellulitis in the right axilla. 

No organized fluid collection or discrete mass and no adenopathy noted.

 

Electronically signed by: Oscar Lamb MD (6/11/2020 2:45 PM) 

[id number]

## 2020-06-11 NOTE — PDOC
PROGRESS NOTES


Chief Complaint


Chief Complaint


 


Right Axillary abscess - empiric vancomycin and zosyn, consult general surgery


Right axillary cellulitis - empiric vancomycin and zosyn


Sepsis - secondary to above with leukocytosis and tachycardia, given the size, 

extent and sepsis outpatient therapy is inappropriate. Will cont empiric van

comycin and zosyn, consult general surgery


Hyperglycemia - will check A1c, likely has diabetes based on his home glucometer

reading > 500mg/dL fasting


Obesity - counseled on diet, exercise, weight loss


Smoker - counseled, offered nicotine patch





History of Present Illness


History of Present Illness


US showed no fluid,  but area is very swollen,  2 cm of induration along about 6

cm lateral side


will continue vanco,





Vitals


Vitals





Vital Signs








  Date Time  Temp Pulse Resp B/P (MAP) Pulse Ox O2 Delivery O2 Flow Rate FiO2


 


6/11/20 08:00      Room Air  


 


6/11/20 07:00 98.0 82 16 123/64 (83) 94   





 98.0       











Physical Exam


General:  Alert, Oriented X3, Cooperative, No acute distress


Heart:  Regular rate


Abdomen:  Normal bowel sounds, Soft, No tenderness, No hepatosplenomegaly, No 

masses


Extremities:  No clubbing, No cyanosis, No edema, Normal pulses, No 

tenderness/swelling


Skin:  No breakdown, No significant lesion, Other (Large [id number] warm red right

axilla with hard mass 4cm x6cm in center, painful to palpation)





Labs


LABS





Laboratory Tests








Test


 6/10/20


20:08 6/11/20


04:34 6/11/20


08:29 6/11/20


10:45


 


White Blood Count


 16.7 x10^3/uL


(4.0-11.0) 12.8 x10^3/uL


(4.0-11.0) 


 





 


Red Blood Count


 4.45 x10^6/uL


(4.30-5.70) 4.25 x10^6/uL


(4.30-5.70) 


 





 


Hemoglobin


 13.6 g/dL


(13.0-17.5) 12.8 g/dL


(13.0-17.5) 


 





 


Hematocrit


 39.6 %


(39.0-53.0) 38.5 %


(39.0-53.0) 


 





 


Mean Corpuscular Volume 89 fL ()  91 fL ()   


 


Mean Corpuscular Hemoglobin 31 pg (25-35)  30 pg (25-35)   


 


Mean Corpuscular Hemoglobin


Concent 34 g/dL


(31-37) 33 g/dL


(31-37) 


 





 


Red Cell Distribution Width


 13.6 %


(11.5-14.5) 13.6 %


(11.5-14.5) 


 





 


Platelet Count


 217 x10^3/uL


(140-400) 203 x10^3/uL


(140-400) 


 





 


Neutrophils (%) (Auto) 80 % (31-73)  75 % (31-73)   


 


Lymphocytes (%) (Auto) 13 % (24-48)  15 % (24-48)   


 


Monocytes (%) (Auto) 6 % (0-9)  8 % (0-9)   


 


Eosinophils (%) (Auto) 1 % (0-3)  2 % (0-3)   


 


Basophils (%) (Auto) 1 % (0-3)  0 % (0-3)   


 


Neutrophils # (Auto)


 13.4 x10^3/uL


(1.8-7.7) 9.6 x10^3/uL


(1.8-7.7) 


 





 


Lymphocytes # (Auto)


 2.1 x10^3/uL


(1.0-4.8) 1.9 x10^3/uL


(1.0-4.8) 


 





 


Monocytes # (Auto)


 1.0 x10^3/uL


(0.0-1.1) 1.0 x10^3/uL


(0.0-1.1) 


 





 


Eosinophils # (Auto)


 0.1 x10^3/uL


(0.0-0.7) 0.2 x10^3/uL


(0.0-0.7) 


 





 


Basophils # (Auto)


 0.1 x10^3/uL


(0.0-0.2) 0.0 x10^3/uL


(0.0-0.2) 


 





 


Segmented Neutrophils % 79 % (35-66)    


 


Band Neutrophils % 2 % (0-9)    


 


Lymphocytes % 14 % (24-48)    


 


Monocytes % 5 % (0-10)    


 


Platelet Estimate


 Adequate


(ADEQUATE) 


 


 





 


Prothrombin Time


 13.6 SEC


(11.7-14.0) 


 


 





 


Prothromb Time International


Ratio 1.1 (0.8-1.1) 


 


 


 





 


Activated Partial


Thromboplast Time 36 SEC (24-38) 


 


 


 





 


Sodium Level


 138 mmol/L


(136-145) 140 mmol/L


(136-145) 


 





 


Potassium Level


 3.3 mmol/L


(3.5-5.1) 3.7 mmol/L


(3.5-5.1) 


 





 


Chloride Level


 102 mmol/L


() 105 mmol/L


() 


 





 


Carbon Dioxide Level


 23 mmol/L


(21-32) 25 mmol/L


(21-32) 


 





 


Anion Gap 13 (6-14)  10 (6-14)   


 


Blood Urea Nitrogen


 9 mg/dL (8-26) 


 10 mg/dL


(8-26) 


 





 


Creatinine


 1.0 mg/dL


(0.7-1.3) 1.1 mg/dL


(0.7-1.3) 


 





 


Estimated GFR


(Cockcroft-Gault) 82.3 


 73.8 


 


 





 


BUN/Creatinine Ratio 9 (6-20)    


 


Glucose Level


 104 mg/dL


(70-99) 105 mg/dL


(70-99) 


 





 


Lactic Acid Level


 0.7 mmol/L


(0.4-2.0) 


 


 





 


Calcium Level


 9.2 mg/dL


(8.5-10.1) 8.5 mg/dL


(8.5-10.1) 


 





 


Total Bilirubin


 0.3 mg/dL


(0.2-1.0) 


 


 





 


Aspartate Amino Transf


(AST/SGOT) 17 U/L (15-37) 


 


 


 





 


Alanine Aminotransferase


(ALT/SGPT) 31 U/L (16-63) 


 


 


 





 


Alkaline Phosphatase


 73 U/L


() 


 


 





 


Total Protein


 7.5 g/dL


(6.4-8.2) 


 


 





 


Albumin


 3.5 g/dL


(3.4-5.0) 


 


 





 


Albumin/Globulin Ratio 0.9 (1.0-1.7)    


 


Procalcitonin


 0.15 ng/mL


(0.00-0.10) 


 


 





 


Magnesium Level


 


 2.0 mg/dL


(1.8-2.4) 


 





 


Glucose (Fingerstick)


 


 


 113 mg/dL


(70-99) 99 mg/dL


(70-99)











Assessment and Plan


Assessmemt and Plan


Problems


Medical Problems:


(1) Abscess of axilla, right


Status: Acute  





(2) Cellulitis of axilla, right


Status: Acute  











Comment


Review of Relevant


I have reviewed the following items brady (where applicable) has been applied.


Labs





Laboratory Tests








Test


 6/10/20


20:08 6/11/20


04:34 6/11/20


08:29 6/11/20


10:45


 


White Blood Count


 16.7 x10^3/uL


(4.0-11.0) 12.8 x10^3/uL


(4.0-11.0) 


 





 


Red Blood Count


 4.45 x10^6/uL


(4.30-5.70) 4.25 x10^6/uL


(4.30-5.70) 


 





 


Hemoglobin


 13.6 g/dL


(13.0-17.5) 12.8 g/dL


(13.0-17.5) 


 





 


Hematocrit


 39.6 %


(39.0-53.0) 38.5 %


(39.0-53.0) 


 





 


Mean Corpuscular Volume 89 fL ()  91 fL ()   


 


Mean Corpuscular Hemoglobin 31 pg (25-35)  30 pg (25-35)   


 


Mean Corpuscular Hemoglobin


Concent 34 g/dL


(31-37) 33 g/dL


(31-37) 


 





 


Red Cell Distribution Width


 13.6 %


(11.5-14.5) 13.6 %


(11.5-14.5) 


 





 


Platelet Count


 217 x10^3/uL


(140-400) 203 x10^3/uL


(140-400) 


 





 


Neutrophils (%) (Auto) 80 % (31-73)  75 % (31-73)   


 


Lymphocytes (%) (Auto) 13 % (24-48)  15 % (24-48)   


 


Monocytes (%) (Auto) 6 % (0-9)  8 % (0-9)   


 


Eosinophils (%) (Auto) 1 % (0-3)  2 % (0-3)   


 


Basophils (%) (Auto) 1 % (0-3)  0 % (0-3)   


 


Neutrophils # (Auto)


 13.4 x10^3/uL


(1.8-7.7) 9.6 x10^3/uL


(1.8-7.7) 


 





 


Lymphocytes # (Auto)


 2.1 x10^3/uL


(1.0-4.8) 1.9 x10^3/uL


(1.0-4.8) 


 





 


Monocytes # (Auto)


 1.0 x10^3/uL


(0.0-1.1) 1.0 x10^3/uL


(0.0-1.1) 


 





 


Eosinophils # (Auto)


 0.1 x10^3/uL


(0.0-0.7) 0.2 x10^3/uL


(0.0-0.7) 


 





 


Basophils # (Auto)


 0.1 x10^3/uL


(0.0-0.2) 0.0 x10^3/uL


(0.0-0.2) 


 





 


Segmented Neutrophils % 79 % (35-66)    


 


Band Neutrophils % 2 % (0-9)    


 


Lymphocytes % 14 % (24-48)    


 


Monocytes % 5 % (0-10)    


 


Platelet Estimate


 Adequate


(ADEQUATE) 


 


 





 


Prothrombin Time


 13.6 SEC


(11.7-14.0) 


 


 





 


Prothromb Time International


Ratio 1.1 (0.8-1.1) 


 


 


 





 


Activated Partial


Thromboplast Time 36 SEC (24-38) 


 


 


 





 


Sodium Level


 138 mmol/L


(136-145) 140 mmol/L


(136-145) 


 





 


Potassium Level


 3.3 mmol/L


(3.5-5.1) 3.7 mmol/L


(3.5-5.1) 


 





 


Chloride Level


 102 mmol/L


() 105 mmol/L


() 


 





 


Carbon Dioxide Level


 23 mmol/L


(21-32) 25 mmol/L


(21-32) 


 





 


Anion Gap 13 (6-14)  10 (6-14)   


 


Blood Urea Nitrogen


 9 mg/dL (8-26) 


 10 mg/dL


(8-26) 


 





 


Creatinine


 1.0 mg/dL


(0.7-1.3) 1.1 mg/dL


(0.7-1.3) 


 





 


Estimated GFR


(Cockcroft-Gault) 82.3 


 73.8 


 


 





 


BUN/Creatinine Ratio 9 (6-20)    


 


Glucose Level


 104 mg/dL


(70-99) 105 mg/dL


(70-99) 


 





 


Lactic Acid Level


 0.7 mmol/L


(0.4-2.0) 


 


 





 


Calcium Level


 9.2 mg/dL


(8.5-10.1) 8.5 mg/dL


(8.5-10.1) 


 





 


Total Bilirubin


 0.3 mg/dL


(0.2-1.0) 


 


 





 


Aspartate Amino Transf


(AST/SGOT) 17 U/L (15-37) 


 


 


 





 


Alanine Aminotransferase


(ALT/SGPT) 31 U/L (16-63) 


 


 


 





 


Alkaline Phosphatase


 73 U/L


() 


 


 





 


Total Protein


 7.5 g/dL


(6.4-8.2) 


 


 





 


Albumin


 3.5 g/dL


(3.4-5.0) 


 


 





 


Albumin/Globulin Ratio 0.9 (1.0-1.7)    


 


Procalcitonin


 0.15 ng/mL


(0.00-0.10) 


 


 





 


Magnesium Level


 


 2.0 mg/dL


(1.8-2.4) 


 





 


Glucose (Fingerstick)


 


 


 113 mg/dL


(70-99) 99 mg/dL


(70-99)








Laboratory Tests








Test


 6/10/20


20:08 6/11/20


04:34 6/11/20


08:29 6/11/20


10:45


 


White Blood Count


 16.7 x10^3/uL


(4.0-11.0) 12.8 x10^3/uL


(4.0-11.0) 


 





 


Red Blood Count


 4.45 x10^6/uL


(4.30-5.70) 4.25 x10^6/uL


(4.30-5.70) 


 





 


Hemoglobin


 13.6 g/dL


(13.0-17.5) 12.8 g/dL


(13.0-17.5) 


 





 


Hematocrit


 39.6 %


(39.0-53.0) 38.5 %


(39.0-53.0) 


 





 


Mean Corpuscular Volume 89 fL ()  91 fL ()   


 


Mean Corpuscular Hemoglobin 31 pg (25-35)  30 pg (25-35)   


 


Mean Corpuscular Hemoglobin


Concent 34 g/dL


(31-37) 33 g/dL


(31-37) 


 





 


Red Cell Distribution Width


 13.6 %


(11.5-14.5) 13.6 %


(11.5-14.5) 


 





 


Platelet Count


 217 x10^3/uL


(140-400) 203 x10^3/uL


(140-400) 


 





 


Neutrophils (%) (Auto) 80 % (31-73)  75 % (31-73)   


 


Lymphocytes (%) (Auto) 13 % (24-48)  15 % (24-48)   


 


Monocytes (%) (Auto) 6 % (0-9)  8 % (0-9)   


 


Eosinophils (%) (Auto) 1 % (0-3)  2 % (0-3)   


 


Basophils (%) (Auto) 1 % (0-3)  0 % (0-3)   


 


Neutrophils # (Auto)


 13.4 x10^3/uL


(1.8-7.7) 9.6 x10^3/uL


(1.8-7.7) 


 





 


Lymphocytes # (Auto)


 2.1 x10^3/uL


(1.0-4.8) 1.9 x10^3/uL


(1.0-4.8) 


 





 


Monocytes # (Auto)


 1.0 x10^3/uL


(0.0-1.1) 1.0 x10^3/uL


(0.0-1.1) 


 





 


Eosinophils # (Auto)


 0.1 x10^3/uL


(0.0-0.7) 0.2 x10^3/uL


(0.0-0.7) 


 





 


Basophils # (Auto)


 0.1 x10^3/uL


(0.0-0.2) 0.0 x10^3/uL


(0.0-0.2) 


 





 


Segmented Neutrophils % 79 % (35-66)    


 


Band Neutrophils % 2 % (0-9)    


 


Lymphocytes % 14 % (24-48)    


 


Monocytes % 5 % (0-10)    


 


Platelet Estimate


 Adequate


(ADEQUATE) 


 


 





 


Prothrombin Time


 13.6 SEC


(11.7-14.0) 


 


 





 


Prothromb Time International


Ratio 1.1 (0.8-1.1) 


 


 


 





 


Activated Partial


Thromboplast Time 36 SEC (24-38) 


 


 


 





 


Sodium Level


 138 mmol/L


(136-145) 140 mmol/L


(136-145) 


 





 


Potassium Level


 3.3 mmol/L


(3.5-5.1) 3.7 mmol/L


(3.5-5.1) 


 





 


Chloride Level


 102 mmol/L


() 105 mmol/L


() 


 





 


Carbon Dioxide Level


 23 mmol/L


(21-32) 25 mmol/L


(21-32) 


 





 


Anion Gap 13 (6-14)  10 (6-14)   


 


Blood Urea Nitrogen


 9 mg/dL (8-26) 


 10 mg/dL


(8-26) 


 





 


Creatinine


 1.0 mg/dL


(0.7-1.3) 1.1 mg/dL


(0.7-1.3) 


 





 


Estimated GFR


(Cockcroft-Gault) 82.3 


 73.8 


 


 





 


BUN/Creatinine Ratio 9 (6-20)    


 


Glucose Level


 104 mg/dL


(70-99) 105 mg/dL


(70-99) 


 





 


Lactic Acid Level


 0.7 mmol/L


(0.4-2.0) 


 


 





 


Calcium Level


 9.2 mg/dL


(8.5-10.1) 8.5 mg/dL


(8.5-10.1) 


 





 


Total Bilirubin


 0.3 mg/dL


(0.2-1.0) 


 


 





 


Aspartate Amino Transf


(AST/SGOT) 17 U/L (15-37) 


 


 


 





 


Alanine Aminotransferase


(ALT/SGPT) 31 U/L (16-63) 


 


 


 





 


Alkaline Phosphatase


 73 U/L


() 


 


 





 


Total Protein


 7.5 g/dL


(6.4-8.2) 


 


 





 


Albumin


 3.5 g/dL


(3.4-5.0) 


 


 





 


Albumin/Globulin Ratio 0.9 (1.0-1.7)    


 


Procalcitonin


 0.15 ng/mL


(0.00-0.10) 


 


 





 


Magnesium Level


 


 2.0 mg/dL


(1.8-2.4) 


 





 


Glucose (Fingerstick)


 


 


 113 mg/dL


(70-99) 99 mg/dL


(70-99)








Medications





Current Medications


Sodium Chloride 1,000 ml @  2,130 mls/hr Q29M IV ;  Start 6/10/20 at 20:11;  

Stop 6/10/20 at 21:11;  Status DC


Piperacillin Sod/ Tazobactam Sod 4.5 gm/Sodium Chloride 100 ml @  200 mls/hr 1X 

ONCE IV  Last administered on 6/10/20at 20:46;  Start 6/10/20 at 21:00;  Stop 

6/10/20 at 21:29;  Status DC


Vancomycin HCl (Vanco Per Pharmacy) 1 each 1X  ONCE MC ;  Start 6/10/20 at 

20:15;  Stop 6/10/20 at 21:47;  Status DC


Morphine Sulfate (Morphine Sulfate) 2 mg PRN Q15MIN  PRN IV/SQ PAIN GREATER THAN

3/10 Last administered on 6/10/20at 20:40;  Start 6/10/20 at 20:15;  Stop 

6/11/20 at 20:14


Acetaminophen (Tylenol) 1,000 mg 1X  ONCE PO  Last administered on 6/10/20at 

20:38;  Start 6/10/20 at 20:15;  Stop 6/10/20 at 20:20;  Status DC


Vancomycin HCl 2 gm/Sodium Chloride 500 ml @  250 mls/hr 1X  ONCE IV  Last 

administered on 6/10/20at 20:47;  Start 6/10/20 at 21:00;  Stop 6/10/20 at 

22:59;  Status DC


Ondansetron HCl (Zofran) 4 mg PRN Q8HRS  PRN IV NAUSEA/VOMITING;  Start 6/10/20 

at 21:45;  Stop 6/10/20 at 21:56;  Status DC


Morphine Sulfate (Morphine Sulfate) 4 mg PRN Q2HR  PRN IV PAIN;  Start 6/10/20 

at 21:45;  Stop 6/11/20 at 11:19;  Status DC


Sodium Chloride 1,000 ml @  100 mls/hr 1X  ONCE IV  Last administered on 

6/10/20at 22:40;  Start 6/10/20 at 21:45;  Stop 6/11/20 at 04:15;  Status DC


Ondansetron HCl (Zofran) 4 mg PRN Q4HRS  PRN IV NAUSEA/VOMITING;  Start 6/10/20 

at 22:00


Ketorolac Tromethamine (Toradol 30mg Vial) 30 mg PRN Q6HRS  PRN IVP SEVERE PAIN 

7-10 Last administered on 6/11/20at 06:20;  Start 6/10/20 at 22:00


Famotidine (Pepcid) 20 mg PRN BID  PRN PO GERD;  Start 6/11/20 at 00:30


Multi-Ingredient Mouthwash/Gargle (Gi Cocktail) 20 ml PRN QID  PRN PO CHEST 

PAIN;  Start 6/11/20 at 00:30


Insulin Human Lispro (HumaLOG) 0-7 UNITS TIDWMEALS SQ ;  Start 6/11/20 at 08:00


Dextrose (Dextrose 50%-Water Syringe) 12.5 gm PRN Q15MIN  PRN IV SEE COMMENTS;  

Start 6/11/20 at 00:30


Vancomycin HCl (Vanco Per Pharmacy) 1 each PRN DAILY  PRN MC SEE COMMENTS;  

Start 6/11/20 at 11:45


Vitals/I & O





Vital Sign - Last 24 Hours








 6/10/20 6/10/20 6/10/20 6/11/20





 19:23 20:55 22:40 02:57


 


Temp 99.8 98.3  98.0





 99.8 98.3  98.0


 


Pulse 113 93  81


 


Resp 18 20  20


 


B/P (MAP) 139/80 (99) 131/88 (102)  115/61 (79)


 


Pulse Ox 97 96  97


 


O2 Delivery Room Air Room Air Room Air Room Air


 


    





    





 6/11/20 6/11/20  





 07:00 08:00  


 


Temp 98.0   





 98.0   


 


Pulse 82   


 


Resp 16   


 


B/P (MAP) 123/64 (83)   


 


Pulse Ox 94   


 


O2 Delivery Room Air Room Air  














Intake and Output   


 


 6/10/20 6/10/20 6/11/20





 15:00 23:00 07:00


 


Intake Total   2520 ml


 


Balance   2520 ml

















WILLIE BARLOW MD                 Jun 11, 2020 11:55

## 2020-06-11 NOTE — PDOC2
ELPIDIO NOWAK APRN 6/11/20 1209:


CONSULT


Date of Consult


Date of Consult


DATE: 6/11/20 


TIME: 12:03





Reason for Consult


Reason for Consult:


axillary abscess





Referring Physician


Referring Physician:


ER





Identification/Chief Complaint


Chief Complaint


swelling axilla





Source


Source:  Chart review, Patient





History of Present Illness


Reason for Visit:


Reports 3 days of worsening pain and axillary swelling to right arm.  Does 

report some improvement since abx started


Small boils in past, but nothing like this


No drainage


Has felt run down last few days, however denies fevers





Past Medical History


Cardiovascular:  HTN


GI:  Diverticulosis


Endocrine:  Diabetes





Past Surgical History


Past Surgical History:  Other (Left knee arthroscopy)





Family History


Family History:  Diabetes, High Cholestrol, Hypertension





Social History


1 pack per day


ALCOHOL:  social


Drugs:  None





Current Problem List


Problem List


Problems


Medical Problems:


(1) Abscess of axilla, right


Status: Acute  





(2) Cellulitis of axilla, right


Status: Acute  











Current Medications


Current Medications





Current Medications


Sodium Chloride 1,000 ml @  2,130 mls/hr Q29M IV ;  Start 6/10/20 at 20:11;  

Stop 6/10/20 at 21:11;  Status DC


Piperacillin Sod/ Tazobactam Sod 4.5 gm/Sodium Chloride 100 ml @  200 mls/hr 1X 

ONCE IV  Last administered on 6/10/20at 20:46;  Start 6/10/20 at 21:00;  Stop 

6/10/20 at 21:29;  Status DC


Vancomycin HCl (Vanco Per Pharmacy) 1 each 1X  ONCE MC ;  Start 6/10/20 at 

20:15;  Stop 6/10/20 at 21:47;  Status DC


Morphine Sulfate (Morphine Sulfate) 2 mg PRN Q15MIN  PRN IV/SQ PAIN GREATER THAN

3/10 Last administered on 6/10/20at 20:40;  Start 6/10/20 at 20:15;  Stop 

6/11/20 at 20:14


Acetaminophen (Tylenol) 1,000 mg 1X  ONCE PO  Last administered on 6/10/20at 

20:38;  Start 6/10/20 at 20:15;  Stop 6/10/20 at 20:20;  Status DC


Vancomycin HCl 2 gm/Sodium Chloride 500 ml @  250 mls/hr 1X  ONCE IV  Last 

administered on 6/10/20at 20:47;  Start 6/10/20 at 21:00;  Stop 6/10/20 at 

22:59;  Status DC


Ondansetron HCl (Zofran) 4 mg PRN Q8HRS  PRN IV NAUSEA/VOMITING;  Start 6/10/20 

at 21:45;  Stop 6/10/20 at 21:56;  Status DC


Morphine Sulfate (Morphine Sulfate) 4 mg PRN Q2HR  PRN IV PAIN;  Start 6/10/20 

at 21:45;  Stop 6/11/20 at 11:19;  Status DC


Sodium Chloride 1,000 ml @  100 mls/hr 1X  ONCE IV  Last administered on 

6/10/20at 22:40;  Start 6/10/20 at 21:45;  Stop 6/11/20 at 04:15;  Status DC


Ondansetron HCl (Zofran) 4 mg PRN Q4HRS  PRN IV NAUSEA/VOMITING;  Start 6/10/20 

at 22:00


Ketorolac Tromethamine (Toradol 30mg Vial) 30 mg PRN Q6HRS  PRN IVP SEVERE PAIN 

7-10 Last administered on 6/11/20at 06:20;  Start 6/10/20 at 22:00


Famotidine (Pepcid) 20 mg PRN BID  PRN PO GERD;  Start 6/11/20 at 00:30


Multi-Ingredient Mouthwash/Gargle (Gi Cocktail) 20 ml PRN QID  PRN PO CHEST 

PAIN;  Start 6/11/20 at 00:30


Insulin Human Lispro (HumaLOG) 0-7 UNITS TIDWMEALS SQ ;  Start 6/11/20 at 08:00


Dextrose (Dextrose 50%-Water Syringe) 12.5 gm PRN Q15MIN  PRN IV SEE COMMENTS;  

Start 6/11/20 at 00:30


Vancomycin HCl (Vanco Per Pharmacy) 1 each PRN DAILY  PRN MC SEE COMMENTS;  

Start 6/11/20 at 11:45


Vancomycin HCl 2 gm/Sodium Chloride 500 ml @  250 mls/hr 1X  ONCE IV ;  Start 

6/11/20 at 13:00;  Stop 6/11/20 at 14:59





Allergies


Allergies:  


Coded Allergies:  


     erythromycin base (Verified  Adverse Reaction, Unknown, Nausea, 6/11/20)


   


   SEVERE ABD PAIN AND NAUSEA





ROS


General:  YES: Fatigue, Other (no fevers )


PSYCHOLOGICAL ROS:  No: Anxiety, Depression


Eyes:  No Blurry vision, No Double vision


HEENT:  No: Heacaches, Sore Throat


Hematological and Lymphatic:  No: Bleeding Problems, Blood Clots


Respiratory:  No: Cough, Shortness of breath


Cardiovascular:  No Chest Pain, No Palpitations


Gastrointestinal:  No Nausea, No Vomiting


Genitourinary:  No Dysuria, No Incontinence


Musculoskeletal:  Yes Other (see hpi)


Neurological:  No Headaches, No Numbness/Tingling





Physical Exam


General:  Alert, Oriented X3, Cooperative


HEENT:  Atraumatic, PERRLA


Lungs:  Clear to auscultation, Normal air movement


Heart:  Regular rate, Normal S1, Normal S2


Abdomen:  Soft, No tenderness


Extremities:  Other (RUE with large palpable mass to right axilla, tender, no 

fluctunance, some swelling and erythema extending to arm, no drainage)


Neuro:  Normal gait, Normal speech


Psych/Mental Status:  Mental status NL, Mood NL


MUSCULOSKELETAL:  No deformity, No swelling





Vitals


VITALS





Vital Signs








  Date Time  Temp Pulse Resp B/P (MAP) Pulse Ox O2 Delivery O2 Flow Rate FiO2


 


6/11/20 11:30 98.4 85 16 103/69 (80) 96 Room Air  





 98.4       











Labs


Labs





Laboratory Tests








Test


 6/10/20


20:08 6/11/20


04:34 6/11/20


08:29 6/11/20


10:45


 


White Blood Count


 16.7 x10^3/uL


(4.0-11.0) 12.8 x10^3/uL


(4.0-11.0) 


 





 


Red Blood Count


 4.45 x10^6/uL


(4.30-5.70) 4.25 x10^6/uL


(4.30-5.70) 


 





 


Hemoglobin


 13.6 g/dL


(13.0-17.5) 12.8 g/dL


(13.0-17.5) 


 





 


Hematocrit


 39.6 %


(39.0-53.0) 38.5 %


(39.0-53.0) 


 





 


Mean Corpuscular Volume 89 fL ()  91 fL ()   


 


Mean Corpuscular Hemoglobin 31 pg (25-35)  30 pg (25-35)   


 


Mean Corpuscular Hemoglobin


Concent 34 g/dL


(31-37) 33 g/dL


(31-37) 


 





 


Red Cell Distribution Width


 13.6 %


(11.5-14.5) 13.6 %


(11.5-14.5) 


 





 


Platelet Count


 217 x10^3/uL


(140-400) 203 x10^3/uL


(140-400) 


 





 


Neutrophils (%) (Auto) 80 % (31-73)  75 % (31-73)   


 


Lymphocytes (%) (Auto) 13 % (24-48)  15 % (24-48)   


 


Monocytes (%) (Auto) 6 % (0-9)  8 % (0-9)   


 


Eosinophils (%) (Auto) 1 % (0-3)  2 % (0-3)   


 


Basophils (%) (Auto) 1 % (0-3)  0 % (0-3)   


 


Neutrophils # (Auto)


 13.4 x10^3/uL


(1.8-7.7) 9.6 x10^3/uL


(1.8-7.7) 


 





 


Lymphocytes # (Auto)


 2.1 x10^3/uL


(1.0-4.8) 1.9 x10^3/uL


(1.0-4.8) 


 





 


Monocytes # (Auto)


 1.0 x10^3/uL


(0.0-1.1) 1.0 x10^3/uL


(0.0-1.1) 


 





 


Eosinophils # (Auto)


 0.1 x10^3/uL


(0.0-0.7) 0.2 x10^3/uL


(0.0-0.7) 


 





 


Basophils # (Auto)


 0.1 x10^3/uL


(0.0-0.2) 0.0 x10^3/uL


(0.0-0.2) 


 





 


Segmented Neutrophils % 79 % (35-66)    


 


Band Neutrophils % 2 % (0-9)    


 


Lymphocytes % 14 % (24-48)    


 


Monocytes % 5 % (0-10)    


 


Platelet Estimate


 Adequate


(ADEQUATE) 


 


 





 


Prothrombin Time


 13.6 SEC


(11.7-14.0) 


 


 





 


Prothromb Time International


Ratio 1.1 (0.8-1.1) 


 


 


 





 


Activated Partial


Thromboplast Time 36 SEC (24-38) 


 


 


 





 


Sodium Level


 138 mmol/L


(136-145) 140 mmol/L


(136-145) 


 





 


Potassium Level


 3.3 mmol/L


(3.5-5.1) 3.7 mmol/L


(3.5-5.1) 


 





 


Chloride Level


 102 mmol/L


() 105 mmol/L


() 


 





 


Carbon Dioxide Level


 23 mmol/L


(21-32) 25 mmol/L


(21-32) 


 





 


Anion Gap 13 (6-14)  10 (6-14)   


 


Blood Urea Nitrogen


 9 mg/dL (8-26) 


 10 mg/dL


(8-26) 


 





 


Creatinine


 1.0 mg/dL


(0.7-1.3) 1.1 mg/dL


(0.7-1.3) 


 





 


Estimated GFR


(Cockcroft-Gault) 82.3 


 73.8 


 


 





 


BUN/Creatinine Ratio 9 (6-20)    


 


Glucose Level


 104 mg/dL


(70-99) 105 mg/dL


(70-99) 


 





 


Lactic Acid Level


 0.7 mmol/L


(0.4-2.0) 


 


 





 


Calcium Level


 9.2 mg/dL


(8.5-10.1) 8.5 mg/dL


(8.5-10.1) 


 





 


Total Bilirubin


 0.3 mg/dL


(0.2-1.0) 


 


 





 


Aspartate Amino Transf


(AST/SGOT) 17 U/L (15-37) 


 


 


 





 


Alanine Aminotransferase


(ALT/SGPT) 31 U/L (16-63) 


 


 


 





 


Alkaline Phosphatase


 73 U/L


() 


 


 





 


Total Protein


 7.5 g/dL


(6.4-8.2) 


 


 





 


Albumin


 3.5 g/dL


(3.4-5.0) 


 


 





 


Albumin/Globulin Ratio 0.9 (1.0-1.7)    


 


Procalcitonin


 0.15 ng/mL


(0.00-0.10) 


 


 





 


Magnesium Level


 


 2.0 mg/dL


(1.8-2.4) 


 





 


Glucose (Fingerstick)


 


 


 113 mg/dL


(70-99) 99 mg/dL


(70-99)








Laboratory Tests








Test


 6/10/20


20:08 6/11/20


04:34 6/11/20


08:29 6/11/20


10:45


 


White Blood Count


 16.7 x10^3/uL


(4.0-11.0) 12.8 x10^3/uL


(4.0-11.0) 


 





 


Red Blood Count


 4.45 x10^6/uL


(4.30-5.70) 4.25 x10^6/uL


(4.30-5.70) 


 





 


Hemoglobin


 13.6 g/dL


(13.0-17.5) 12.8 g/dL


(13.0-17.5) 


 





 


Hematocrit


 39.6 %


(39.0-53.0) 38.5 %


(39.0-53.0) 


 





 


Mean Corpuscular Volume 89 fL ()  91 fL ()   


 


Mean Corpuscular Hemoglobin 31 pg (25-35)  30 pg (25-35)   


 


Mean Corpuscular Hemoglobin


Concent 34 g/dL


(31-37) 33 g/dL


(31-37) 


 





 


Red Cell Distribution Width


 13.6 %


(11.5-14.5) 13.6 %


(11.5-14.5) 


 





 


Platelet Count


 217 x10^3/uL


(140-400) 203 x10^3/uL


(140-400) 


 





 


Neutrophils (%) (Auto) 80 % (31-73)  75 % (31-73)   


 


Lymphocytes (%) (Auto) 13 % (24-48)  15 % (24-48)   


 


Monocytes (%) (Auto) 6 % (0-9)  8 % (0-9)   


 


Eosinophils (%) (Auto) 1 % (0-3)  2 % (0-3)   


 


Basophils (%) (Auto) 1 % (0-3)  0 % (0-3)   


 


Neutrophils # (Auto)


 13.4 x10^3/uL


(1.8-7.7) 9.6 x10^3/uL


(1.8-7.7) 


 





 


Lymphocytes # (Auto)


 2.1 x10^3/uL


(1.0-4.8) 1.9 x10^3/uL


(1.0-4.8) 


 





 


Monocytes # (Auto)


 1.0 x10^3/uL


(0.0-1.1) 1.0 x10^3/uL


(0.0-1.1) 


 





 


Eosinophils # (Auto)


 0.1 x10^3/uL


(0.0-0.7) 0.2 x10^3/uL


(0.0-0.7) 


 





 


Basophils # (Auto)


 0.1 x10^3/uL


(0.0-0.2) 0.0 x10^3/uL


(0.0-0.2) 


 





 


Segmented Neutrophils % 79 % (35-66)    


 


Band Neutrophils % 2 % (0-9)    


 


Lymphocytes % 14 % (24-48)    


 


Monocytes % 5 % (0-10)    


 


Platelet Estimate


 Adequate


(ADEQUATE) 


 


 





 


Prothrombin Time


 13.6 SEC


(11.7-14.0) 


 


 





 


Prothromb Time International


Ratio 1.1 (0.8-1.1) 


 


 


 





 


Activated Partial


Thromboplast Time 36 SEC (24-38) 


 


 


 





 


Sodium Level


 138 mmol/L


(136-145) 140 mmol/L


(136-145) 


 





 


Potassium Level


 3.3 mmol/L


(3.5-5.1) 3.7 mmol/L


(3.5-5.1) 


 





 


Chloride Level


 102 mmol/L


() 105 mmol/L


() 


 





 


Carbon Dioxide Level


 23 mmol/L


(21-32) 25 mmol/L


(21-32) 


 





 


Anion Gap 13 (6-14)  10 (6-14)   


 


Blood Urea Nitrogen


 9 mg/dL (8-26) 


 10 mg/dL


(8-26) 


 





 


Creatinine


 1.0 mg/dL


(0.7-1.3) 1.1 mg/dL


(0.7-1.3) 


 





 


Estimated GFR


(Cockcroft-Gault) 82.3 


 73.8 


 


 





 


BUN/Creatinine Ratio 9 (6-20)    


 


Glucose Level


 104 mg/dL


(70-99) 105 mg/dL


(70-99) 


 





 


Lactic Acid Level


 0.7 mmol/L


(0.4-2.0) 


 


 





 


Calcium Level


 9.2 mg/dL


(8.5-10.1) 8.5 mg/dL


(8.5-10.1) 


 





 


Total Bilirubin


 0.3 mg/dL


(0.2-1.0) 


 


 





 


Aspartate Amino Transf


(AST/SGOT) 17 U/L (15-37) 


 


 


 





 


Alanine Aminotransferase


(ALT/SGPT) 31 U/L (16-63) 


 


 


 





 


Alkaline Phosphatase


 73 U/L


() 


 


 





 


Total Protein


 7.5 g/dL


(6.4-8.2) 


 


 





 


Albumin


 3.5 g/dL


(3.4-5.0) 


 


 





 


Albumin/Globulin Ratio 0.9 (1.0-1.7)    


 


Procalcitonin


 0.15 ng/mL


(0.00-0.10) 


 


 





 


Magnesium Level


 


 2.0 mg/dL


(1.8-2.4) 


 





 


Glucose (Fingerstick)


 


 


 113 mg/dL


(70-99) 99 mg/dL


(70-99)











Assessment/Plan


Assessment/Plan


axillary mass--US reviewed, no discrete abscess





will check CT to further eval, continue abx


will need covid testing if surgical intervention required


continue abx





HOLLIE MENENDEZ MD 6/11/20 1305:


CONSULT


Assessment/Plan


Assessment/Plan


Patient seen and examined by me.  Right axillary erythema tenderness reviewed 

ultrasound CT scan is complete has not been read as of yet but it looks like 

there is a large fluid collection within the axilla that would be amicable to 

drainage.  Agree with Maria Guadalupe assessment and plan we will plan for incision and 

drainage of right axillary abscess once COVID status has been established











ELPIDIO NOWAK            Jun 11, 2020 12:09


HOLLIE MENENDEZ MD             Jun 11, 2020 13:05